# Patient Record
Sex: FEMALE | Race: WHITE
[De-identification: names, ages, dates, MRNs, and addresses within clinical notes are randomized per-mention and may not be internally consistent; named-entity substitution may affect disease eponyms.]

---

## 2017-02-23 ENCOUNTER — HOSPITAL ENCOUNTER (OUTPATIENT)
Dept: HOSPITAL 39 - GMAM | Age: 72
Discharge: HOME | End: 2017-02-23
Attending: FAMILY MEDICINE
Payer: MEDICARE

## 2017-02-23 DIAGNOSIS — R80.9: Primary | ICD-10-CM

## 2017-06-26 ENCOUNTER — HOSPITAL ENCOUNTER (OUTPATIENT)
Dept: HOSPITAL 39 - MAMMO | Age: 72
Discharge: HOME | End: 2017-06-26
Attending: FAMILY MEDICINE
Payer: MEDICARE

## 2017-06-26 DIAGNOSIS — Z12.31: Primary | ICD-10-CM

## 2017-06-27 NOTE — MAM
History: Well woman exam.



Date of exam: 6/26/2017 



Services provided: Bilateral full field digital screening

mammography.

CAD, the images were reviewed with R2 computer aided detection.



FINDINGS: Glandular tissue is scattered glandular contour.

Comparison with 2014 exam. No dominant mass, architectural

distortion or clustered microcalcification.



IMPRESSION:  Benign exam



Recommendation: Routine annual mammography



BIRAD CATEGORY: 2 BENIGN





Electronically signed by:  Romelia Marquez MD  6/27/2017 10:31 AM

CDT Workstation: [id number]

## 2018-03-27 ENCOUNTER — HOSPITAL ENCOUNTER (OUTPATIENT)
Dept: HOSPITAL 39 - GMAM | Age: 73
End: 2018-03-27
Attending: FAMILY MEDICINE
Payer: MEDICARE

## 2018-03-27 DIAGNOSIS — R80.9: Primary | ICD-10-CM

## 2018-07-11 ENCOUNTER — HOSPITAL ENCOUNTER (OUTPATIENT)
Dept: HOSPITAL 39 - MAMMO | Age: 73
End: 2018-07-11
Attending: FAMILY MEDICINE
Payer: MEDICARE

## 2018-07-11 DIAGNOSIS — Z12.31: Primary | ICD-10-CM

## 2018-07-12 NOTE — MAM
EXAM DESCRIPTION: 

3D Screening BILATERAL : Digital Mammography.



CLINICAL HISTORY: 

72 years Female SCREENING . No complaints. Remote family history

of breast cancer. Childbirth. Hysterectomy. HRT more than 5 years

ago..



COMPARISON: 

2-D digital screening bilateral mammography 6/26/2017.  Report

from prior examination also reviewed.



TECHNIQUE: 

Bilateral CC and MLO projection full-field images,  3-D

tomosynthesis digital mammographic technique.  CAD not utilized. 

 



FINDINGS: 

The breast parenchymal density pattern is:  Scattered areas of

fibroglandular density.   No skin thickening or nipple

retraction.  Minimum bilateral vascular calcifications.

No new focal, stellate mass or density, focal asymmetry , and no

suspicious microcalcifications bilaterally. Stable mammograms

compared to prior study, taking into account differences in

mammographic technique.



IMPRESSION: 

BI-RADS CATEGORY: 2 - BENIGN FINDINGS.

FOLLOW UP: Routine digital bilateral  screening, one year

interval from  July 2018.



Written communication explaining the IMPRESSION and follow-up,

will be mailed to the patient and referring health care provider.



According to the [employer], yearly mammograms

are recommended starting at age 40 and continuing as long as a

woman is in good health.  Any breast change noted on a breast

self-exam should be reported promptly to the patient's healthcare

provider.  Breast MRI is recommended for women with an

approximately 20-25% or greater lifetime risk of breast cancer,

including women with a strong family history of breast or ovarian

cancer and women who have been treated for Hodgkin's disease.



A negative mammographic report should not delay tissue diagnosis

in patients with significant clinical history or physical

findings.



Extremely dense breast tissue limits the sensitivity of digital

mammography. 



Electronically signed by:  Kumar Saravia MD  7/12/2018 11:49 AM

CDT Workstation: [id number]

## 2019-06-28 ENCOUNTER — HOSPITAL ENCOUNTER (OUTPATIENT)
Dept: HOSPITAL 39 - GMAM | Age: 74
End: 2019-06-28
Attending: FAMILY MEDICINE
Payer: MEDICARE

## 2019-06-28 DIAGNOSIS — E11.9: ICD-10-CM

## 2019-06-28 DIAGNOSIS — I10: ICD-10-CM

## 2019-06-28 DIAGNOSIS — E78.5: ICD-10-CM

## 2019-06-28 DIAGNOSIS — R53.83: Primary | ICD-10-CM

## 2019-07-01 ENCOUNTER — HOSPITAL ENCOUNTER (OUTPATIENT)
Dept: HOSPITAL 39 - GMAM | Age: 74
End: 2019-07-01
Attending: FAMILY MEDICINE
Payer: MEDICARE

## 2019-07-01 DIAGNOSIS — M25.50: Primary | ICD-10-CM

## 2019-07-16 ENCOUNTER — HOSPITAL ENCOUNTER (OUTPATIENT)
Dept: HOSPITAL 39 - MAMMO | Age: 74
End: 2019-07-16
Attending: FAMILY MEDICINE
Payer: MEDICARE

## 2019-07-16 DIAGNOSIS — Z12.31: Primary | ICD-10-CM

## 2019-07-17 NOTE — MAM
EXAM DESCRIPTION: 

3D Screening BILATERAL : Digital Mammography.



CLINICAL HISTORY: 

73 years Female Annual Screening . No complaints. No personal or

fascial history of breast cancer. Remote family history of

ovarian cancer. Childbirth. Postmenopausal. Taking HRT 5 or more

years ago. Benign right breast biopsy. Lifetime risk of

developing breast cancer (Tyrer-Cuzick model)(%):  4.4.



COMPARISON: 

Bilateral screening digital breast tomosynthesis 7/11/2018.    



TECHNIQUE: 

Bilateral CC and MLO projection full-field images, digital

tomosynthesis mammographic technique. Bilateral digital 2-D

full-field MLO images.  CAD not available for tomosynthesis or

2-D images.  



FINDINGS: 

The breast parenchymal density pattern is:  Scattered areas of

fibroglandular density.   No skin thickening or nipple

retraction. 

No new focal, stellate mass or density, focal asymmetry , and no

suspicious microcalcifications bilaterally. Stable mammograms

compared to prior study. 



IMPRESSION: 

BI-RADS CATEGORY: 1 - NEGATIVE

FOLLOW UP: Routine digital bilateral screening, one year interval

from July 2019.



Written communication explaining the findings and follow-up, will

be mailed to the patient and referring health care provider.



According to the [employer], yearly mammograms

are recommended starting at age 40 and continuing as long as a

woman is in good health.  Any breast change noted on a breast

self-exam should be reported promptly to the patient's healthcare

provider.  Breast MRI is recommended for women with an

approximately 20-25% or greater lifetime risk of breast cancer,

including women with a strong family history of breast or ovarian

cancer and women who have been treated for Hodgkin's disease.



A negative mammographic report should not delay tissue diagnosis

in patients with significant clinical history or physical

findings.



Extremely dense breast tissue limits the sensitivity of digital

mammography.



Electronically signed by:  Kumar Saarvia MD  7/17/2019 8:26 PM CDT

Workstation: [id number]

## 2019-11-06 ENCOUNTER — HOSPITAL ENCOUNTER (EMERGENCY)
Dept: HOSPITAL 39 - ER | Age: 74
Discharge: HOME | End: 2019-11-06
Payer: MEDICARE

## 2019-11-06 VITALS — DIASTOLIC BLOOD PRESSURE: 74 MMHG | TEMPERATURE: 98 F | SYSTOLIC BLOOD PRESSURE: 138 MMHG | OXYGEN SATURATION: 95 %

## 2019-11-06 DIAGNOSIS — Z79.82: ICD-10-CM

## 2019-11-06 DIAGNOSIS — I51.9: ICD-10-CM

## 2019-11-06 DIAGNOSIS — R19.7: ICD-10-CM

## 2019-11-06 DIAGNOSIS — R11.2: ICD-10-CM

## 2019-11-06 DIAGNOSIS — Z79.899: ICD-10-CM

## 2019-11-06 DIAGNOSIS — E11.9: ICD-10-CM

## 2019-11-06 DIAGNOSIS — R10.10: Primary | ICD-10-CM

## 2019-11-06 DIAGNOSIS — I10: ICD-10-CM

## 2019-11-06 PROCEDURE — 76705 ECHO EXAM OF ABDOMEN: CPT

## 2019-11-06 PROCEDURE — 83690 ASSAY OF LIPASE: CPT

## 2019-11-06 PROCEDURE — 81001 URINALYSIS AUTO W/SCOPE: CPT

## 2019-11-06 PROCEDURE — 85025 COMPLETE CBC W/AUTO DIFF WBC: CPT

## 2019-11-06 PROCEDURE — 36415 COLL VENOUS BLD VENIPUNCTURE: CPT

## 2019-11-06 PROCEDURE — 80053 COMPREHEN METABOLIC PANEL: CPT

## 2019-11-06 NOTE — ED.PDOC
History of Present Illness





- General


Time Seen by Provider: 19 11:09


Information Source: patient


Exam Limitations: no limitations





- History of Present Illness


Initial Comments: 





Abdominal Pain


Pt presents for 1 week h/o upper abdominal pain and nausea, vomiting, diarrhea. 

States the pain comes on after she eats.  Has vomited x 3 in past week and had 

loose stools.  Denies fever, chills, CP, SOB.  Has been seen by PCP for this and

told to come to ED today for US gallbladder.  PSH of hysterectomy.


Abdominal Pain Onset Location: RUQ, epigastric


Pain Radiation: no radiation


Quality: moderate


Timing/Duration: 1 week, intermittent


Improving Factors: nothing


Worsening Factors: eating


Associated Symptoms: diarrhea, nausea/vomiting





Review of Systems





- Review of Systems


Constitutional: Denies: chills, fever, weakness


EENTM: Denies: nose congestion, throat pain


Respiratory: Denies: cough, short of breath


Cardiology: Denies: chest pain, palpitations, syncope


Gastrointestinal/Abdominal: States: see HPI, abdominal pain





Past Medical History (General)





- Patient Medical History


Hx Seizures: No


Hx Stroke: No


Hx Asthma: No


Hx of COPD: No


Hx Cardiac Disorders: Yes


Hx Congestive Heart Failure: No


Hx Pacemaker: No


Hx Hypertension: Yes


Hx Diabetes: Yes


Hx MRSA: No





- Social History


Hx Alcohol Use: Yes - One glass wine HS.


Hx Substance Use: No


Hx Physical Abuse: No


Hx Emotional Abuse: No





Family Medical History





- Family History


  ** Father


Family History: Unknown


Name: Ananth Elizabeth


Living Status: 


Age at Death (years of age): 67


Hx Family Asthma: No


Hx Family Congestive Heart Failure: No


Hx Family Hypertension: Yes - Pt, sister and brother.


Hx Family Stroke: No


Hx Cardiac Disease: Yes - Father and brother.


Hx Family Diabetes: Yes - Pt, father and brother.


Hx Family Cancer: Yes - Mother- melenoma





Physical Exam





- Physical Exam


General Appearance: Alert, Comfortable, No apparent distress


Neck: non-tender, full range of motion, supple


Respiratory: chest non-tender, lungs clear, normal breath sounds, no respiratory

distress


Cardiovascular/Chest: regular rate, rhythm, no edema, no murmur


Gastrointestinal/Abdominal: non tender, soft, no pulsatile mass


Back Exam: normal inspection, no CVA tenderness


Extremity: normal range of motion, non-tender, normal inspection


Neurologic: no motor/sensory deficits, alert, normal mood/affect


Skin Exam: normal color, warm/dry





Progress





- Progress


Progress: 





19 13:07


Pt presents with 1 week h/o epigastric pain, NVD after eating.  Abdomen is soft 

and NTTP.  No sign of acute abdomen at this time.  Labs and imaging reassuring. 

Pt will continue Prilosec hernández and f/u with pcp in 1-2 days for recheck.  srp 

given





- Results/Orders


Results/Orders: 





                                        





19 11:19


Gall Bladder [US] Stat 








                         Laboratory Results - last 24 hr











  19





  11: 11:19 12:30


 


WBC  2.4 L*  


 


RBC  4.19 L  


 


Hgb  13.0  


 


Hct  39.0  


 


MCV  93.2  


 


MCH  31.0  


 


MCHC  33.2  


 


RDW  12.8  


 


Plt Count  215  


 


MPV  7.6  


 


Absolute Neuts (auto)  Not Reportable  


 


Absolute Lymphs (auto)  Not Reportable  


 


Absolute Monos (auto)  Not Reportable  


 


Absolute Eos (auto)  Not Reportable  


 


Neutrophils %  Not Reportable  


 


Neutrophils % (Manual)  47.0  


 


Lymphocytes %  Not Reportable  


 


Lymphocytes % (Manual)  29.0  


 


Monocytes %  Not Reportable  


 


Monocytes % (Manual)  12.0  


 


Eosinophils %  Not Reportable  


 


Basophils %  Not Reportable  


 


Band Neutrophils  5.0 H  


 


Eosinophils  7.0  


 


Toxic Granulation  1+  


 


Platelet Estimate  Normal  


 


Normal RBC Morphology  Normal rbc morph  


 


Sodium   137 


 


Potassium   3.8 


 


Chloride   101 


 


Carbon Dioxide   23 


 


Anion Gap   16.8 


 


BUN   13 


 


Creatinine   1.06 


 


BUN/Creatinine Ratio   12.3 


 


Random Glucose   144 H 


 


Serum Osmolality   276.5 


 


Calcium   9.7 


 


Total Bilirubin   0.4 


 


AST   35 


 


ALT   23 


 


Alkaline Phosphatase   37 L 


 


Serum Total Protein   8.2 


 


Albumin   4.5 


 


Globulin   3.7 H 


 


Albumin/Globulin Ratio   1.2 


 


Lipase   42 


 


Urine Color    Yellow


 


Urine Appearance    Clear


 


Urine pH    7.0


 


Ur Specific Gravity    1.025


 


Urine Protein    30


 


Urine Glucose (UA)    Negative


 


Urine Ketones    Negative


 


Urine Blood    Negative


 


Urine Nitrite    Negative


 


Urine Bilirubin    Small H


 


Urine Urobilinogen    0.2


 


Ur Leukocyte Esterase    Negative


 


Urine RBC    0-1


 


Urine WBC    3-5 HUS


 


Ur Epithelial Cells    3-5


 


Amorphous Sediment    Trace


 


Urine Bacteria    Rare


 


Hyaline Casts    3-5


 


Urine Mucus    Moderate








RUQ US performed and shows fatty liver.  No evidence for cholelithiasis or 

biliary duct dilitation





Departure





- Departure


Clinical Impression: 


Abdominal pain


Qualifiers:


 Abdominal location: upper abdomen, unspecified Qualified Code(s): R10.10 - 

Upper abdominal pain, unspecified





Vomiting


Qualifiers:


 Vomiting type: unspecified Vomiting Intractability: non-intractable Nausea 

presence: with nausea Qualified Code(s): R11.2 - Nausea with vomiting, 

unspecified





Time of Disposition: 13:09


Disposition: Discharge to Home or Self Care


Condition: Good


Departure Forms:  ED Discharge - Pt. Copy


Instructions:  DI for Abdominal Pain-Adult


Diet: bland diet


Referrals: 


Robby Contreras MD [Primary Care Provider] - 1-2 Weeks


Prescriptions: 


Ondansetron [Ondansetron Odt] 4 mg PO Q6HR PRN #15 tab


 PRN Reason: Nausea


Home Medications: 


Ambulatory Orders





Benazepril HCl [Lotensin] 40 mg PO DAILY 13 


Omeprazole [Prilosec Cap] 20 mg PO DAILY 13 


Simvastatin 40 mg PO BEDTIME 13 


Aspirin [Eql Aspirin Low Dose] 81 mg PO DAILY 16 


Liraglutide [Victoza] 18 mg SC DAILY 16 


Calcium Carbonate-Vitamin D [Calcium-Carb 600 + D 600-125 mg-Unit] 1 tab PO 

BEDTIME 16 


Citalopram Hydrobromide 40 mg PO DAILY 16 


Estrogens, Conjugated Vaginal [Premarin] 0.625 mg VA BEDTIME 16 


Multiple Vitamins W/ Minerals [Centrum Adults] 1 tab PO DAILY 16 


Nitroglycerin 0.4 mg Tab [Nitrostat] 0.4 mg SL Q5MIN PRN 16 


Nitroglycerin Patch 0.4 mg/Hr [Nitro-Dur PATCH] 0.4 mg TD .ON 12HRS-OFF 12 HRS 

#30 patch 16 


amLODIPine BESYLATE [Norvasc] 5 mg PO BEDTIME #0 tab 16 


Ondansetron [Ondansetron Odt] 4 mg PO Q6HR PRN #15 tab 19

## 2019-11-12 NOTE — US
EXAM DESCRIPTION: 

Abdomen,Limited: ULTRASOUND.



CLINICAL HISTORY: 

RUQ pain



COMPARISON: 

None.



TECHNIQUE: 

Transabdominal scanning: gray-scale mode.  Doppler mode.



FINDINGS: 

Gallbladder: normal size, shape, echogenicity; no intraluminal

stones or sludge. No fluid around the gallbladder. No wall

thickening. 2.1 mm. Non-tender with transducer pressure.

Common bile duct: caliber 4.6 mm within normal limits. 

Liver:  Heterogeneously increased echogenicity; contour liver

capsule smooth where seen. No fluid around the liver.

Intrahepatic biliary ducts normal caliber.  Portal vein flow

direction not evaluated.  Long axis right lobe 13.1 cm.

Pancreas: normal size and echogenicity.  Duct not seen. 

Proximal abdominal aorta: diameter. 

IVC: visualized and normal caliber.

Right kidney: long axis measures 9.8 cm. Increased Echogenicity.

Minimal reduction in cortical thickness.  No echogenic stones or

hydronephrosis.



IMPRESSION: 

1. Steatosis of the liver with normal size. Normal caliber of the

ducts. Smooth capsule with no ascites.

2. Gallbladder, common bile duct, pancreas unremarkable. Normal

caliber of the proximal abdominal aorta and IVC. Age-related

changes of the right kidney.



Report called to Dr. Copeland in the Emergency Medicine Department

at approximately 1215 hours today.



Electronically signed by:  Kumar Saravia MD  11/6/2019 8:06 PM CST

Workstation: [id number]

## 2020-09-30 ENCOUNTER — HOSPITAL ENCOUNTER (OUTPATIENT)
Dept: HOSPITAL 39 - MAMMO | Age: 75
End: 2020-09-30
Attending: FAMILY MEDICINE
Payer: MEDICARE

## 2020-09-30 DIAGNOSIS — Z12.31: Primary | ICD-10-CM

## 2020-10-06 NOTE — MAM
EXAM DESCRIPTION: 

3D Screening BILATERAL : Digital Mammography.



CLINICAL HISTORY: 

74 years Female SCREENING history sheet. Lifetime risk of

developing breast cancer (Tyrer-Cuzick model)(%):  Not calculated



COMPARISON: 

prior.  No prior reports available.  



TECHNIQUE: 

Bilateral CC and MLO projection full-field images, digital

tomosynthesis mammographic technique. Bilateral digital 2-D

full-field MLO images.  CAD available for 2-D images.  



FINDINGS: 

The breast parenchymal density pattern is: Scattered areas of

fibroglandular density. No skin thickening or nipple retraction. 

Solitary parenchymal calcifications. Bilateral axillary nodes.

Vascular calcifications. Enlargement of the small nodular density

medial anterior left breast, 4.7 cm from the nipple at 8:00-8:30.

Possibly a lymph node. Not associated with microcalcifications. 

No new focal, stellate mass or density, focal asymmetry , and no

suspicious microcalcifications right breast.  



IMPRESSION: 

BI-RADS CATEGORY: 0 - INCOMPLETE- Need additional imaging

evaluation.



RECOMMENDATIONS:

FOLLOW-UP: Recall for additional imaging: Directed left breast

ultrasound to the region of interest. Optional diagnostic digital

breast tomosynthesis left breast if the ultrasound findings are

equivocal..



Written communication concerning the IMPRESSION and Follow-up,

will be mailed to the patient and referring health care provider.



Electronically signed by:  Kumar Saravia MD  10/6/2020 4:31 PM CDT

Workstation: [id number]

## 2020-10-26 ENCOUNTER — HOSPITAL ENCOUNTER (OUTPATIENT)
Dept: HOSPITAL 39 - MAMMO | Age: 75
End: 2020-10-26
Attending: FAMILY MEDICINE
Payer: MEDICARE

## 2020-10-26 DIAGNOSIS — E11.9: ICD-10-CM

## 2020-10-26 DIAGNOSIS — D64.9: ICD-10-CM

## 2020-10-26 DIAGNOSIS — I10: ICD-10-CM

## 2020-10-26 DIAGNOSIS — R92.8: Primary | ICD-10-CM

## 2020-10-26 DIAGNOSIS — E78.5: ICD-10-CM

## 2020-10-27 NOTE — US
EXAM DESCRIPTION: 

Breast,Left: Ultrasound.



CLINICAL HISTORY: 

74 yearsFemaleABNORMAL MAMMOGRAM enlarging mass density left

breast.



COMPARISON: 

Bilateral screening digital breast tomosynthesis September 30, 2020.



TECHNIQUE: 

Transcutaneous scanning of the left breast utilizing gray-scale

and Doppler modes. Scanning performed by the sonographer ;

observation by Dr. Saravia.



FINDINGS: 

Scanning left breast anterior middle third medial to the

posterior nipple line. Mostly fatty tissues with minimal

fibroglandular tissues. Circumscribed hypoechoic and echogenic

object or parallel orientation and nonvascular with overall

dimensions of 6.4 x 3.8 mm. In the components measuring 3.7 and

2.5 mm. Differential includes lymph node or cystic cluster.



IMPRESSION: 

Benign exam.



BIRAD CATEGORY: 2 BENIGN FINDINGS.



RECOMMENDATIONS:

FOLLOW UP: Return to routine digital bilateral  mammographic

screening, one year interval from  October 2020.



Written communication explaining the IMPRESSION and follow-up,

will be mailed to the patient and referring health care provider.





According to the [employer], yearly mammograms

are recommended starting at age 40 and continuing as long as a

woman is in good health.  Any breast change noted on a breast

self-exam should be reported promptly to the patient's healthcare

provider.  Breast MRI is recommended for women with an

approximately 20-25% or greater lifetime risk of breast cancer,

including women with a strong family history of breast or ovarian

cancer and women who have been treated for Hodgkin's disease.  A

negative mammographic report should not delay tissue diagnosis in

patients with significant clinical history or physical findings. 

Extremely dense breast tissue limits the sensitivity of digital

mammography. 







Electronically signed by:  Kumar Saravia MD  10/27/2020 7:24 PM

CDT Workstation: [id number]

## 2021-01-28 ENCOUNTER — HOSPITAL ENCOUNTER (OUTPATIENT)
Dept: HOSPITAL 39 - LAB.O | Age: 76
End: 2021-01-28
Attending: FAMILY MEDICINE
Payer: MEDICARE

## 2021-01-28 DIAGNOSIS — B82.9: Primary | ICD-10-CM
